# Patient Record
Sex: FEMALE | Race: WHITE
[De-identification: names, ages, dates, MRNs, and addresses within clinical notes are randomized per-mention and may not be internally consistent; named-entity substitution may affect disease eponyms.]

---

## 2018-06-15 ENCOUNTER — HOSPITAL ENCOUNTER (OUTPATIENT)
Dept: HOSPITAL 46 - ED | Age: 58
Setting detail: OBSERVATION
LOS: 1 days | Discharge: HOME | End: 2018-06-16
Attending: SURGERY | Admitting: SURGERY
Payer: COMMERCIAL

## 2018-06-15 VITALS — HEIGHT: 63 IN | WEIGHT: 153 LBS | BODY MASS INDEX: 27.11 KG/M2

## 2018-06-15 DIAGNOSIS — E03.9: ICD-10-CM

## 2018-06-15 DIAGNOSIS — Z79.899: ICD-10-CM

## 2018-06-15 DIAGNOSIS — K35.3: Primary | ICD-10-CM

## 2018-06-15 DIAGNOSIS — Z87.891: ICD-10-CM

## 2018-06-15 PROCEDURE — 0DTJ4ZZ RESECTION OF APPENDIX, PERCUTANEOUS ENDOSCOPIC APPROACH: ICD-10-PCS | Performed by: SURGERY

## 2018-06-15 NOTE — NUR
PATIENT ARRIVED FROM PACU AT 2020 VIA STRETCHER WITH PING HEATH TO GIVE REPORT.
PATIENT HAS BEEN PAIN FREE. CMS TO ALL EXTREMETIES WNL. 3 ABD LAP APPY SITES
WITH 2X2 DRESINGS COVERED WITH TEGADERM. LOWER ABD SITE HAS SMALL AMOUNT OF
RED DRAINAGE ON IT THE OTHER 2 SITES ARE CDI. BOWEL TONES ACTIVE AND LUNGS ARE
CLEAR. SCD'S APPLIED TO BOTH LOWER LEGS AND LR IS RUNNING AT 125MLS HR AND IV
FLUSHES WELL.

## 2018-06-15 NOTE — NUR
06/15/18 2014 Dalia Pineda
2006 PATIENT ARRIVES TO PACU SLEEPING, AWAKENS WITH VERBAL STIMULI.
RESP EVEN AND UNLABORED, MASK AT 10 LITERS, DECREASED TO 6 LITERS,
SATS 99%.
2014 PATIENT AWAKE OFF/ON, TALKING WITH  DENJUNIOR PAIN OR NAUSEA.
MASK OFF, 97% ON ROOM AIR. BACK TO SLEEP.

## 2018-06-15 NOTE — NUR
PATIENT WAS RESTING QUIETLY WITH EYES CLOSED. PATIENT VS REMAIN STABLE AND
PATIENT IS PAIN FREE OTHER THAN BEING A LITTLE TENDER TO ABD PALPATION.
PATIENT HAS BEEN TOLERATING ICE CHIPS/WATER/JELLO WITHOUT ANY PROBLEMS. CALL
LIGHT IN REACH.

## 2018-06-15 NOTE — NUR
PATIENT IS STILL PAIN FREE ANDAMBULATED TO THE BATHROOM WITH 1PSBA. PATIENT
VOIDED 750MLS OF CLEAR LIGHT YELLOW URINE AND AMBULATED BACK TO BED WITHOUT
DIFFICULTY. PATIENT'S  AND DAUGHTER ARE AT BEDSIDE. CALL LIGHT IN
REACH.

## 2018-06-16 NOTE — EKG
[facility]
                                    [address]
                                  [place]  [address]
_________________________________________________________________________________________
                                                                 Signed   
 
 
Normal sinus rhythm
Nonspecific ST abnormality
Abnormal ECG
No previous ECGs available
Confirmed by ADVONTE BOSS MD ([id number]) on 6/16/2018 9:07:20 AM
 
 
 
 
 
 
 
 
 
 
 
 
 
 
 
 
 
 
 
 
 
 
 
 
 
 
 
 
 
 
 
 
 
 
 
 
 
 
 
 
    Electronically Signed By: DAVONTE BOSS MD  06/16/18 0907
_________________________________________________________________________________________
PATIENT NAME:     CARL BOSWELL                         
MEDICAL RECORD #: [id number]                     Electrocardiogram             
          ACCT #: [id number]  
DATE OF BIRTH:   [birth year]                                       
PHYSICIAN:   DAVONTE BOSS MD           REPORT #: [id number]
REPORT IS CONFIDENTIAL AND NOT TO BE RELEASED WITHOUT AUTHORIZATION

## 2018-06-16 NOTE — NUR
PATIENT HAS DONE WELL THROUGH THE NIGHT ONLY REQUIRING 1 NORCO FOR 5/10 ABD
PAIN AFTER WALKING TO THE RESTROOM. PATIENT HAS BEEN UP TO THE RESTROOM X2
WITHOUT ANY DIFFICULTY, TAKING PO FLUIDS, JELLO, AND PUDDING. PATIENT'S
VS HAVE
BEEN STABLE, NO FEVER, LUNGS CLEAR, BOWEL TONES ACTIVE. DR. ERNANDEZ IS HERE TO
DISCHARGE THE PATIENT AND PATIENT'S  IS AT BEDSIDE. REPORT GIVEN TO
PING COLON. PATIENT'S BREAKFAST IS ON THE WAY.

## 2018-06-16 NOTE — NUR
PATIENT TOLERATED BREAKFAST WELL WITH NO NAUSEA. EATING ABOUT 75 PERCENT OF
BREAKFAST. PATIENT IS IND WALKING TO BR. REQUESTING 1 TAB NORCO FOR PAIN.
PATIENT AGREED TO AMBULATE IN SOOD IN 15 MINUTES.

## 2018-06-16 NOTE — NUR
PATIENT UP TO THE BATHROOM AND AFTER RETURNING TO BED C/O 5/10 ABD PAIN. 1 PO
NORCO GIVEN. PATIENT GOING TO TRY AND GO BACK TO SLEEP. CALL LIGHT IN REACH.

## 2018-06-16 NOTE — NUR
PATIENT RESTING QUIETLY SUPINE. EYES CLOSED AND RESPIRATIONS EVEN AND REGULAR.
LIGHT SNORING. O2 SAT 93% ON RA AND HR PER SAT MONITOR 66BPM. CALL LIGHT IN
REACH.

## 2018-06-18 NOTE — DS
[facility]
                                    [address] [place] [address], [place]  [address]
_________________________________________________________________________________________
                                                                 Signed   
 
 
ADMISSION DATE:  06/15/2018
 
DISCHARGE DATE:  06/16/2018
 
HISTORY OF PRESENT ILLNESS:
The patient is a 57-year-old white female with a 2-day history of abdominal pain.
Several hours prior to admission she started complaining of more severe pain, and
tenderness in the right lower quadrant of the abdomen.  She was then brought to the
emergency department where studies revealed evidence of suggest acute appendicitis by
CAT scan, and by physical examination.  White count was elevated over [address]126 with an
elevated neutrophil. 
 
PHYSICAL EXAMINATION:
GENERAL:  Revealed a well-developed, well-nourished female in mild distress, complaining
of severe pain in the right lower quadrant of the abdomen. 
ABDOMEN:  Pertinent abdominal examination revealed a soft abdomen with severe
tenderness, and mild rebound in the right lower quadrant of the abdomen.  The rest of
the physical examination was unremarkable. 
 
The patient was started on Zosyn 3.375 g preoperatively.  The patient was then brought
to the operating room theater where laparoscopic appendectomy was performed revealing
evidence of severely inflamed appendicitis, fairly long with gangrenous area at the tip
of the appendix. 
 
The patient tolerated the procedure very well, and postoperatively continued to do well
requiring only one p.o. Norco medication.  She was continued on Zosyn for three more
doses.  On 06/16/2018, the patient continued to do well, tolerated her breakfast.  The
abdomen is soft, with slightly hyperactive bowel sounds.  The dressing is intact.  The
patient will be discharged on [place] 325/10, 1 or 2 p.o. every 4 to 6 hours for pain, and
she will be followed by Dr. Baker in one week. 
 
FINAL DIAGNOSIS:
Appendicitis acute.
 
PROCEDURE PERFORMED:
Laparoscopic appendectomy.
 
 
 
            ________________________________________
            Edgard Ernandez MD 
 
 
    Electronically Signed By: EDGARD ERNANDEZ MD  06/18/18 1127
_________________________________________________________________________________________
PATIENT NAME:     CARL BOSWELL ASHLEY                         
MEDICAL RECORD #: [id number]            DISCHARGE SUMMARY             
          ACCT #: [id number]  
DATE OF BIRTH:   [birth year]            REPORT #: [id number]      
PHYSICIAN:        EDGARD ERNANDEZ MD         
PCP:              NO PRIMARY CARE PHYSICIAN     
REPORT IS CONFIDENTIAL AND NOT TO BE RELEASED WITHOUT AUTHORIZATION
 
 
                                  [address] [place]  [address]
_________________________________________________________________________________________
                                                                 Signed   
 
 
 
[facility]
Job #:  [id number]
DD:  06/16/2018 07:38:08
DT:  06/17/2018 00:05:31
 
 
Copies:                                
~
 
 
 
 
 
 
 
 
 
 
 
 
 
 
 
 
 
 
 
 
 
 
 
 
 
 
 
 
 
 
 
 
 
 
    Electronically Signed By: EDGARD ERNANDEZ MD  06/18/18 1127
_________________________________________________________________________________________
PATIENT NAME:     ELBERTCARL                         
MEDICAL RECORD #: [id number]            DISCHARGE SUMMARY             
          ACCT #: [id number]  
DATE OF BIRTH:   [birth year]            REPORT #: [id number]      
PHYSICIAN:        EDGARD ERNANDEZ MD         
PCP:              NO PRIMARY CARE PHYSICIAN     
REPORT IS CONFIDENTIAL AND NOT TO BE RELEASED WITHOUT AUTHORIZATION

## 2018-06-18 NOTE — OR
[facility]
                                    [address] [place]  [address]
_________________________________________________________________________________________
                                                                 Signed   
 
 
DATE OF OPERATION:
06/15/2018
 
SURGEON:
Edgard Ernandez MD
 
PREOPERATIVE DIAGNOSIS:
Appendicitis, acute.
 
POSTOPERATIVE DIAGNOSIS:
Appendicitis, acute.
 
OPERATIVE PROCEDURE:
Laparoscopic appendectomy.
 
ANESTHESIA:
General endotracheal.
 
ANESTHESIOLOGIST:
Kendrick Robertson CRNA
 
ASSISTANT:
Tameka Jo RN
 
CIRCULATING NURSE:
Josseline Fry RN.
 
SPECIMEN REMOVED:
Inflamed appendix.
 
DESCRIPTION OF PROCEDURE:
Under satisfactory general anesthesia and the patient in the supine position, a Graves
catheter was inserted.  The abdomen was then prepped and draped in the usual sterile
fashion.  A time-out was called to identify the patient correctly and the type of
procedure to be performed.  The abdominal cavity was then insufflated through a Adelina
trocar inserted through a small incision in the umbilicus and anchored to the
fascia using 0
Vicryl.  Following satisfactory insufflation, the patient was placed in a reverse
Trendelenburg position and tilted to the left side.  Using the video camera, suprapubic
placement of a 12 mm trocar was performed through a small transverse incision.  A 5 mm
trocar was then inserted in the right lower quadrant of the abdomen at McBurney's point
area.  Exploration revealed an acutely inflamed long appendix.  This was also adherent
 
    Electronically Signed By: EDGARD ERNANDEZ MD  06/18/18 1136
_________________________________________________________________________________________
PATIENT NAME:     CARL BOSWELL                         
MEDICAL RECORD #: [id number]            OPERATIVE REPORT              
          ACCT #: [id number]  
DATE OF BIRTH:   [birth year]            REPORT #: [id number]      
PHYSICIAN:        EDGARD ERNANDEZ MD         
PCP:              NO PRIMARY CARE PHYSICIAN     
REPORT IS CONFIDENTIAL AND NOT TO BE RELEASED WITHOUT AUTHORIZATION
 
 
                                  [facility]
                                    [address] [place], [place]  [address]
_________________________________________________________________________________________
                                                                 Signed   
 
 
to the right fallopian tube and this was easily .  The base of
the appendix was then identified.  A window was then created and using an Endo stapler,
the base of the appendix was stapled and amputated at the same time.  The mesoappendix
was then dissected and this was transected using an Endo stapler.  The entire area was
irrigated with normal saline.  The specimen then was placed in Endobag and pulled out
through the umbilical incision.  The Adelina trocar was then re-inserted.  Laparoscopic
exploration was then performed revealing no other pathology in the abdominal cavity.
Again, the pelvic area and right lower quadrant of the abdomen were copiously irrigated
with normal saline.  The fascia in the suprapubic area was closed with 0 vicryl simple
interrupted with the aid of
an Endo Close,
after removal of 12 mm trocar.  The fascia in the
umbilicus was approximated with figure-of-eight 0 Vicryl.  All the site of incision was
then infiltrated with 0.25% Marcaine with epinephrine.  The skin was approximated using
4-0 Monocryl subcuticular interrupted.  Steri-Strips applied on the suprapubic incision
in the right lower quadrant.  Antibiotic ointment applied in the umbilicus.  Sterile
dressings were applied.  The patient tolerated the procedure well, was brought out of
the operating room, and taken to the PACU in satisfactory condition. 
 
 
 
            ________________________________________
            Edgard Ernandez MD 
 
 
[facility]
Job #:  [id number]
DD:  06/15/2018 20:56:28
DT:  06/16/2018 03:15:29
 
 
Copies:                                
~
 
 
 
 
 
 
 
 
 
 
    Electronically Signed By: EDGARD ERNANDEZ MD  06/18/18 1136
_________________________________________________________________________________________
PATIENT NAME:     ELBERTCARL                         
MEDICAL RECORD #: [id number]            OPERATIVE REPORT              
          ACCT #: [id number]  
DATE OF BIRTH:   [birth year]            REPORT #: [id number]      
PHYSICIAN:        EDGARD ERNANDEZ MD         
PCP:              NO PRIMARY CARE PHYSICIAN     
REPORT IS CONFIDENTIAL AND NOT TO BE RELEASED WITHOUT AUTHORIZATION

## 2018-06-18 NOTE — HP
[facility]
                                    [address] [place], [place]  [address]
_________________________________________________________________________________________
                                                                 Signed   
 
 
ADMISSION DATE:  
06/15/2018
 
CHIEF COMPLAINT:  
Severe right lower quadrant abdominal pain and tenderness.
 
HISTORY OF PRESENT ILLNESS:  
The patient stated that 2 days prior to admission she started complaining of vague
abdominal pain, thinking that she probably had flu, however the condition did not
improve.  The following day, she continued to have more or less constant pain
 becoming more generalized and today the pain localized to the right
lower quadrant of the abdomen with tenderness.  She had nausea, but no vomiting.  Does
not have any other symptoms except she felt sweaty and warm, but not sure whether she
had fever. 
 
PAST MEDICAL HISTORY:  
The patient has hypothyroidism and on medication in the form of levothyroxine sodium 100
mcg p.o. daily. 
 
ALLERGIES:  
No known allergy.
 
PAST SURGICAL HISTORY:  
None.
 
SOCIAL HISTORY:  
Former smoker.
 
REVIEW OF SYSTEMS:  
10-system review was conducted and was negative other than what was mentioned involved
in the HPI. 
 
PHYSICAL EXAMINATION:
VITAL SIGNS:  Temperature 98.7, pulse 62 per minute, respirations 14, blood pressure
127/64. 
GENERAL CONDITION:  Well developed, well nourished, in mild distress complaining of pain
and tenderness in the right lower quadrant of the abdomen. 
HEAD, EYES, EAR, NOSE, AND THROAT:  Exam is normal.  No scleral icterus.  Mucous
membranes are noted to be moist. 
NECK:  No abnormal mass palpated.  No rigidity. 
CHEST:  Clear breath sounds. 
HEART:  Regular sinus rhythm.  No murmur appreciated. 
 
    Electronically Signed By: EDGARD HELLER MD  06/18/18 1136
_________________________________________________________________________________________
PATIENT NAME:     CARL BOSWELL                         
MEDICAL RECORD #: [id number]            HISTORY AND PHYSICAL          
          ACCT #: [id number]  
DATE OF BIRTH:   [birth year]            REPORT #: [id number]      
PHYSICIAN:        EDGARD HELLER MD         
PCP:              NO PRIMARY CARE PHYSICIAN     
REPORT IS CONFIDENTIAL AND NOT TO BE RELEASED WITHOUT AUTHORIZATION
 
 
                                  [facility]
                                    [address] [place] [address]  [address]
_________________________________________________________________________________________
                                                                 Signed   
 
 
LUNGS:  Clear to auscultation. 
ABDOMEN:  Soft, presence of severe tenderness with mild rebound in right lower quadrant
of the abdomen.  Bowel sounds positive.  No rigidity. 
 
LABORATORY DATA:  
Elevated white cell of over 11,000 and neutrophils of over 80%.  CAT scan revealed
evidence suggestive of acute appendicitis. 
 
ASSESSMENT AND PLAN:  
A 57-year-old white female presented with significant tenderness in the right lower
quadrant of the abdomen with rebound.  Laboratory data revealed elevated white count
with elevated neutrophils and a CAT scan revealing evidence suggestive of acute
appendicitis.  With the diagnosis of acute appendicitis, plan is to proceed with
laparoscopic appendectomy, possibly open.  The indication, risks involved, possible
complication was discussed with the patient and her  who agreed with the plan. 
 
 
 
            ________________________________________
            Edgard Heller MD 
 
 
[facility]
Job #:  [id number]
DD:  06/15/2018 18:37:21
DT:  06/16/2018 01:46:34
 
 
Copies:                                
~
 
 
 
 
 
 
 
 
 
 
 
 
 
    Electronically Signed By: EDGARD HELLER MD  06/18/18 1136
_________________________________________________________________________________________
PATIENT NAME:     CARL BOSWELL                         
MEDICAL RECORD #: [id number]            HISTORY AND PHYSICAL          
          ACCT #: [id number]  
DATE OF BIRTH:   [birth year]            REPORT #: [id number]      
PHYSICIAN:        EDGARD HELLER MD         
PCP:              NO PRIMARY CARE PHYSICIAN     
REPORT IS CONFIDENTIAL AND NOT TO BE RELEASED WITHOUT AUTHORIZATION